# Patient Record
Sex: MALE | Race: WHITE | Employment: FULL TIME | URBAN - METROPOLITAN AREA
[De-identification: names, ages, dates, MRNs, and addresses within clinical notes are randomized per-mention and may not be internally consistent; named-entity substitution may affect disease eponyms.]

---

## 2020-04-27 ENCOUNTER — THERAPY VISIT (OUTPATIENT)
Dept: PHYSICAL THERAPY | Facility: CLINIC | Age: 45
End: 2020-04-27
Payer: COMMERCIAL

## 2020-04-27 DIAGNOSIS — M54.12 CERVICAL RADICULOPATHY: ICD-10-CM

## 2020-04-27 PROCEDURE — 97012 MECHANICAL TRACTION THERAPY: CPT | Mod: GP | Performed by: PHYSICAL THERAPIST

## 2020-04-27 PROCEDURE — 97110 THERAPEUTIC EXERCISES: CPT | Mod: GP | Performed by: PHYSICAL THERAPIST

## 2020-04-27 PROCEDURE — 97161 PT EVAL LOW COMPLEX 20 MIN: CPT | Mod: GP | Performed by: PHYSICAL THERAPIST

## 2020-04-27 NOTE — PROGRESS NOTES
[facility] Initial Evaluation  Subjective:  The history is provided by the patient. No  was used.   Patient Health History  Kelby Gibbons being seen for neck and back pain.     Problem began: 1/27/2020.   Problem occurred: unknown   Pain is reported as 3/10 on pain scale.  General health as reported by patient is good.  Pertinent medical history includes: high blood pressure and overweight.   Red flags:  None as reported by patient.  Medical allergies: none.   Surgeries include:  None.    Current medications:  Anti-inflammatory and anti-seizure medication.    Current occupation is [employer].   Primary job tasks include:  Computer work and prolonged sitting.                  Therapist Generated HPI Evaluation  Problem details: He has been dealing with neck/upper back pain for the past 2 months.  The pain started in his R shoulder.  He noticed the pain initially in the AM with getting out of bed, but would improve as the day progressed.  He eventually had numbness into his R thumb and index finger.  He had an epidural injection 4/23/20 with 70% improvement.  His numbness/tingling is constant.  The pain is worse with working at computer, laying in bed.  Feels better with better with being active..         Type of problem:  Cervical spine.    This is a new condition.  Condition occurred with:  Insidious onset.  Where condition occurred: for unknown reasons.  Patient reports pain:  Upper cervical spine.  Pain is described as sharp and is constant.  Pain radiates to:  Elbow right, shoulder right and hand left. Pain is worse in the P.M. and worse in the A.M..  Since onset symptoms are gradually improving.  Associated symptoms:  Tingling, numbness and loss of motion/stiffness. Symptoms are exacerbated by looking up or down, lying down, sitting and certain positions  and relieved by activity/movement.  Special tests included:  MRI (C5-C6 herniation).    Restrictions due to condition  include:  Working in normal job without restrictions.  Barriers include:  None as reported by patient.                        Objective:  Kelby Gibbons , : 1975, MRN: [id number]    Physical Therapy Objective Findings  Subjective information, goals, clinical impression, daily documentation and other information found in EPISODES tab.    Cervical Objective Findings  Objective:  Posture:  Mod forward head, mod rounded shoulders  Range of Motion:  Cervical Flexion                              50 - pain R shoulder                                                                                                                         Cervical Extension 7 - pain R shoulder   Cervical Side Bending R     32 - pain R shoulder L    38 - mild pain R shoulder   Cervical Rotation R     65 - mild pain R shoulder L     75 - mild pain R shoulder   Thoracic Flexion    Thoracic Extension    Thoracic Side Bending R L   Thoracic Rotation R 90% L 90%   Other:    (in degrees)  Shoulder Screen:   AROM Findings                                                                            wnl B shoulder                                                                              Manual Muscle Testing (required if radicular)  (graded 0-5, measured at 0 degrees unless otherwise noted):                                                                                          Right                                                  Left                                                      Shoulder shrug (C2-C4)     Shoulder Abd (C5) 5 5   Shoulder Add (C7) 4+ 5   Shoulder ER (C5,C6) 4+ 5   Shoulder IR (C5,C6) 5 5   Elbow Flex (C5,C6) 5 5   Elbow Ext (C7) 4 5   Wrist Ext (C6) 5 5   Wrist Flex (C7) 4 5   Thumb Abd (C8) 5 5   5th Finger Add (T1) 5 5   (+ mild pain, ++ moderate pain, +++ severe pain)    Dermatome/Sensory Testing: normal to light touch BUE  Reflex Testing:  Jaw  Jerk (brainstem)                                                                                                                                                Scapulohumeral (C1-C4) normal   Brachioradialis (C5)  - Finger flexion with test is pathological normal   Biceps (C5,C6) normal   Triceps (C7) Decreased R       Special Tests:                                                                                                                    Positive                    Negative                         Vetebral Artery Test  x   Alar Ligament Test  x   Transverse Ligament Test  x   Spurling s Test X R    Cervical Distraction Test x    Neck Flexor Endurance Test (normal 39 sec)     Cervical Rotation/Lateral Flexion Test    Repeated cervical retraction            Repeated cervical retraction with pt overpressure        Repeated cervical retraction with extension       Mild improvement in cervical extension and rotation    Improvement in triceps and extension motion    Centralizing of pain from elbow to shoulder                                                                                                                               Positive                    Negative                        Neer's   x   Hawkin's  x   Relocation  x   Sulcus       Flexibility                                                                                                                         Right                          Left                                  Upper Trap normal normal   Levator Scap normal normal   Scalene Mod tightness Mod tightness   Pec Minor 4 finger tightness 4 finger tightness       Palpation: mild tender R scalenes        -------------------------------------------  Symptoms beyond the shoulder attach: .CERVICALRADICULAR    Assessment/Plan:    Patient is a 44 year old male with cervical complaints.    Patient has the following significant findings with corresponding treatment plan.                Diagnosis 1:  Neck pain consistent with C7 n root compression    Pain -   hot/cold therapy, mechanical traction, manual therapy, self management, education, directional preference exercise and home program  Decreased ROM/flexibility - manual therapy, therapeutic exercise, therapeutic activity and home program  Decreased strength - therapeutic exercise, therapeutic activities and home program  Decreased function - therapeutic activities and home program  Impaired posture - neuro re-education, therapeutic activities and home program    Therapy Evaluation Codes:   1) History comprised of:   Personal factors that impact the plan of care:      Past/current experiences, Profession and Time since onset of symptoms.    Comorbidity factors that impact the plan of care are:      None.     Medications impacting care: Anti-inflammatory.  2) Examination of Body Systems comprised of:   Body structures and functions that impact the plan of care:      Cervical spine.   Activity limitations that impact the plan of care are:      Cooking, Driving, Reading/Computer work, Sitting, Working, Sleeping and Laying down.  3) Clinical presentation characteristics are:   Evolving/Changing.  4) Decision-Making    Low complexity using standardized patient assessment instrument and/or measureable assessment of functional outcome.  Cumulative Therapy Evaluation is: Low complexity.    Previous and current functional limitations:  (See Goal Flow Sheet for this information)    Short term and Long term goals: (See Goal Flow Sheet for this information)     Communication ability:  Patient appears to be able to clearly communicate and understand verbal and written communication and follow directions correctly.  Treatment Explanation - The following has been discussed with the patient:   RX ordered/plan of care  Anticipated outcomes  Possible risks and side effects  This patient would benefit from PT intervention to resume normal activities.   Rehab potential is good.    Frequency:  1 X week, once daily  Duration:  for 8  weeks  Discharge Plan:  Achieve all LTG.  Independent in home treatment program.  Reach maximal therapeutic benefit.    Please refer to the daily flowsheet for treatment today, total treatment time and time spent performing 1:1 timed codes.     Cruzito Christopher,PT, DPT, OCS

## 2020-04-27 NOTE — LETTER
[facility]  [address]  [facility] [address] [phone]    2020    Re: Kelby Gibbons   :   1975  MRN:  [id number]   REFERRING PHYSICIAN:   Rodolfo Parsons    [facility]    Date of Initial Evaluation:  2020  Visits:  Rxs Used: 1  Reason for Referral:  Cervical radiculopathy    EVALUATION SUMMARY    [facility]  Subjective:  The history is provided by the patient. No  was used.   Patient Health History  Kelby Gibbons being seen for neck and back pain.     Problem began: 2020.   Problem occurred: unknown   Pain is reported as 3/10 on pain scale.  General health as reported by patient is good.  Pertinent medical history includes: high blood pressure and overweight.   Red flags:  None as reported by patient.  Medical allergies: none.   Surgeries include:  None.    Current medications:  Anti-inflammatory and anti-seizure medication.    Current occupation is [employer].   Primary job tasks include:  Computer work and prolonged sitting.                Therapist Generated HPI Evaluation  Problem details: He has been dealing with neck/upper back pain for the past 2 months.  The pain started in his R shoulder.  He noticed the pain initially in the AM with getting out of bed, but would improve as the day progressed.  He eventually had numbness into his R thumb and index finger.  He had an epidural injection 20 with 70% improvement.  His numbness/tingling is constant.  The pain is worse with working at computer, laying in bed.  Feels better with better with being active..         Type of problem:  Cervical spine.    This is a new condition.  Condition occurred with:  Insidious onset.  Where condition occurred: for unknown reasons.  Patient reports pain:  Upper cervical spine.  Pain is described as sharp and is  constant.  Pain radiates to:  Elbow right, shoulder right and hand left. Pain is worse in the P.M. and worse in the A.M..  Since onset symptoms are gradually improving.  Associated symptoms:  Tingling, numbness and loss of motion/stiffness. Symptoms are exacerbated by looking up or down, lying down, sitting and certain positions  and relieved by activity/movement.  Special tests included:  MRI (C5-C6 herniation).    Restrictions due to condition include:  Working in normal job without restrictions.  Barriers include:  None as reported by patient.            Objective:  Kelby Gibbons , : 1975, MRN: [id number]    Physical Therapy Objective Findings  Subjective information, goals, clinical impression, daily documentation and other information found in EPISODES tab.  Cervical Objective Findings  Objective:  Posture:  Mod forward head, mod rounded shoulders  Range of Motion:  Cervical Flexion                              50 - pain R shoulder                                                                                                                         Cervical Extension 7 - pain R shoulder   Cervical Side Bending R     32 - pain R shoulder L    38 - mild pain R shoulder   Cervical Rotation R     65 - mild pain R shoulder L     75 - mild pain R shoulder   Thoracic Flexion    Thoracic Extension    Thoracic Side Bending R L   Thoracic Rotation R 90% L 90%   Other:    (in degrees)  Shoulder Screen:   AROM Findings                                                                            wnl B shoulder                                                                              Manual Muscle Testing (required if radicular)  (graded 0-5, measured at 0 degrees unless otherwise noted):                                                                                          Right                                                  Left                                                      Shoulder shrug (C2-C4)      Shoulder Abd (C5) 5 5   Shoulder Add (C7) 4+ 5   Shoulder ER (C5,C6) 4+ 5   Shoulder IR (C5,C6) 5 5   Elbow Flex (C5,C6) 5 5   Elbow Ext (C7) 4 5   Wrist Ext (C6) 5 5   Wrist Flex (C7) 4 5   Thumb Abd (C8) 5 5   5th Finger Add (T1) 5 5   (+ mild pain, ++ moderate pain, +++ severe pain)    Dermatome/Sensory Testing: normal to light touch BUE  Reflex Testing:  Jaw  Jerk (brainstem)                                                                                                                                               Scapulohumeral (C1-C4) normal   Brachioradialis (C5)  - Finger flexion with test is pathological normal   Biceps (C5,C6) normal   Triceps (C7) Decreased R       Special Tests:                                                                                                                    Positive                    Negative                         Vetebral Artery Test  x   Alar Ligament Test  x   Transverse Ligament Test  x   Spurling s Test X R    Cervical Distraction Test x    Neck Flexor Endurance Test (normal 39 sec)     Cervical Rotation/Lateral Flexion Test    Repeated cervical retraction            Repeated cervical retraction with pt overpressure        Repeated cervical retraction with extension       Mild improvement in cervical extension and rotation    Improvement in triceps and extension motion    Centralizing of pain from elbow to shoulder                                                                                                                               Positive                    Negative                        Neer's   x   Hawkin's  x   Relocation  x   Sulcus       Flexibility                                                                                                                         Right                          Left                                  Upper Trap normal normal   Levator Scap normal normal   Scalene Mod tightness Mod tightness   Pec Minor 4  finger tightness 4 finger tightness         Palpation: mild tender R scalenes    -------------------------------------------  Symptoms beyond the shoulder attach: .CERVICALRADICULAR    Assessment/Plan:    Patient is a 44 year old male with cervical complaints.    Patient has the following significant findings with corresponding treatment plan.                Diagnosis 1:  Neck pain consistent with C7 n root compression    Pain -  hot/cold therapy, mechanical traction, manual therapy, self management, education, directional preference exercise and home program  Decreased ROM/flexibility - manual therapy, therapeutic exercise, therapeutic activity and home program  Decreased strength - therapeutic exercise, therapeutic activities and home program  Decreased function - therapeutic activities and home program  Impaired posture - neuro re-education, therapeutic activities and home program    Therapy Evaluation Codes:   1) History comprised of:   Personal factors that impact the plan of care:      Past/current experiences, Profession and Time since onset of symptoms.    Comorbidity factors that impact the plan of care are:      None.     Medications impacting care: Anti-inflammatory.  2) Examination of Body Systems comprised of:   Body structures and functions that impact the plan of care:      Cervical spine.   Activity limitations that impact the plan of care are:      Cooking, Driving, Reading/Computer work, Sitting, Working, Sleeping and Laying down.  3) Clinical presentation characteristics are:   Evolving/Changing.  4) Decision-Making    Low complexity using standardized patient assessment instrument and/or measureable assessment of functional outcome.  Cumulative Therapy Evaluation is: Low complexity.    Previous and current functional limitations:  (See Goal Flow Sheet for this information)    Short term and Long term goals: (See Goal Flow Sheet for this information)     Communication ability:  Patient appears to  be able to clearly communicate and understand verbal and written communication and follow directions correctly.  Treatment Explanation - The following has been discussed with the patient:   RX ordered/plan of care  Anticipated outcomes  Possible risks and side effects  This patient would benefit from PT intervention to resume normal activities.   Rehab potential is good.    Frequency:  1 X week, once daily  Duration:  for 8 weeks  Discharge Plan:  Achieve all LTG.  Independent in home treatment program.  Reach maximal therapeutic benefit.      Thank you for your referral.    INQUIRIES  Therapist: Cruzito Christopher,PT, DPT, [facility]    [facility]  [address] [place] [address]. [phone]  [facility] [address]  Phone: [phone]  Fax: [phone]

## 2020-05-05 ENCOUNTER — THERAPY VISIT (OUTPATIENT)
Dept: PHYSICAL THERAPY | Facility: CLINIC | Age: 45
End: 2020-05-05
Payer: COMMERCIAL

## 2020-05-05 DIAGNOSIS — M54.12 CERVICAL RADICULOPATHY: ICD-10-CM

## 2020-05-05 PROCEDURE — 97012 MECHANICAL TRACTION THERAPY: CPT | Mod: GP | Performed by: PHYSICAL THERAPIST

## 2020-05-05 PROCEDURE — 97112 NEUROMUSCULAR REEDUCATION: CPT | Mod: GP | Performed by: PHYSICAL THERAPIST

## 2020-05-05 PROCEDURE — 97110 THERAPEUTIC EXERCISES: CPT | Mod: GP | Performed by: PHYSICAL THERAPIST

## 2020-06-09 PROBLEM — M54.12 CERVICAL RADICULOPATHY: Status: RESOLVED | Noted: 2020-04-27 | Resolved: 2020-06-09

## 2020-06-09 NOTE — PROGRESS NOTES
Discharge Note    Progress reporting period is from initial evaluation date (please see noted date below) to May 5, 2020.  Linked Episodes   Type: Episode: Status: Noted: Resolved: Last update: Updated by:   PHYSICAL THERAPY cervical radic 4/27/20 Active 4/27/2020 5/5/2020  8:41 AM Cruzito Christopher, PT      Comments:       Kelby failed to follow up and current status is unknown.  Please see information below for last relevant information on current status.  Patient seen for 2 visits.    SUBJECTIVE  Subjective changes noted by patient:  he is doing well, he had a lot of relief with after visit, he is completely off pain meds, he is still having some tingling in finger tips, he is having a little bit of HAs with the exercises, his pain is much more positional  .  Current pain level is 0/10(worst 5/10).     Previous pain level was  10/10.   Changes in function:  Yes (See Goal flowsheet attached for changes in current functional level)  Adverse reaction to treatment or activity: None    OBJECTIVE  Changes noted in objective findings: cervical ROM: flex 62, ext 58, R SB 32,  LSB 37, R rot 72, L rot 75, MMT: wrist flex 4+/5, triceps 4/5     ASSESSMENT/PLAN  Diagnosis: cervical radic   Updated problem list and treatment plan:   Pain - HEP  Decreased ROM/flexibility - HEP  Decreased function - HEP  Decreased strength - HEP  STG/LTGs have been met or progress has been made towards goals:  Yes, please see goal flowsheet for most current information  Assessment of Progress: current status is unknown.    Last current status: Pt is progressing as expected   Self Management Plans:  HEP  I have re-evaluated this patient and find that the nature, scope, duration and intensity of the therapy is appropriate for the medical condition of the patient.  Kelby continues to require the following intervention to meet STG and LTG's:  HEP.    Recommendations:  Discharge with current home program.  Patient to follow up with MD as  needed.    Please refer to the daily flowsheet for treatment today, total treatment time and time spent performing 1:1 timed codes.    Cruzito Christopher,PT, DPT, OCS